# Patient Record
Sex: MALE | URBAN - METROPOLITAN AREA
[De-identification: names, ages, dates, MRNs, and addresses within clinical notes are randomized per-mention and may not be internally consistent; named-entity substitution may affect disease eponyms.]

---

## 2022-03-03 ENCOUNTER — APPOINTMENT (RX ONLY)
Dept: URBAN - METROPOLITAN AREA CLINIC 152 | Facility: CLINIC | Age: 37
Setting detail: DERMATOLOGY
End: 2022-03-03

## 2022-03-03 DIAGNOSIS — L81.4 OTHER MELANIN HYPERPIGMENTATION: ICD-10-CM

## 2022-03-03 DIAGNOSIS — L82.1 OTHER SEBORRHEIC KERATOSIS: ICD-10-CM

## 2022-03-03 DIAGNOSIS — L21.8 OTHER SEBORRHEIC DERMATITIS: ICD-10-CM

## 2022-03-03 DIAGNOSIS — D22 MELANOCYTIC NEVI: ICD-10-CM

## 2022-03-03 DIAGNOSIS — D18.0 HEMANGIOMA: ICD-10-CM

## 2022-03-03 PROBLEM — D18.01 HEMANGIOMA OF SKIN AND SUBCUTANEOUS TISSUE: Status: ACTIVE | Noted: 2022-03-03

## 2022-03-03 PROBLEM — D22.5 MELANOCYTIC NEVI OF TRUNK: Status: ACTIVE | Noted: 2022-03-03

## 2022-03-03 PROCEDURE — ? PRESCRIPTION MEDICATION MANAGEMENT

## 2022-03-03 PROCEDURE — 99203 OFFICE O/P NEW LOW 30 MIN: CPT

## 2022-03-03 PROCEDURE — ? COUNSELING

## 2022-03-03 PROCEDURE — ? OBSERVATION AND MEASURE

## 2022-03-03 ASSESSMENT — LOCATION DETAILED DESCRIPTION DERM: LOCATION DETAILED: SUPRAPUBIC SKIN

## 2022-03-03 ASSESSMENT — LOCATION ZONE DERM: LOCATION ZONE: TRUNK

## 2022-03-03 ASSESSMENT — LOCATION SIMPLE DESCRIPTION DERM: LOCATION SIMPLE: GROIN

## 2022-03-03 NOTE — HPI: OTHER
Condition:: FBSE
Please Describe Your Condition:: Pt is present for a full body skin exam, no spot of concern today. Pt reports no family or personal history of skin cancer. Pt also reports a rash on his temple that has come and gone for the last 2 years that becomes sometimes dry and scaly

## 2022-03-03 NOTE — PROCEDURE: COUNSELING
Detail Level: Simple
Sunscreen Recommendations: CHEMICAL BLOCKERS\\n\\nEucerin Daily Protection Face Lotion \\nNeutrogena Invisible Daily Defense Serum \\nCeraVe Ultra-Light Moisturizing Lotion \\nNeutrogena Hydroboost \\nCetaphil Face Moisturizer \\nLa Roche Posay Toleriane Double Repair Facial Moisturizer \\nElta MD UV Clear \\nLa Roche Posay Antihelios Melt-In Milk Sunscreen \\n\\nPHYSICAL BLOCKERS\\n\\nAveeno Positively Mineral Sunscreen \\nCetaphil Sheer Mineral Face \\nCeraVe Hydrating Mineral Sunscreen (sheer tint) \\nLa Roche Posay Antihelios Mineral Ultra LIght Fluid Face Sunscreen  \\nElta MD UV Daily \\nAvene Solaire UV MIneral Multi-Defense Sunscreen\\nSupergoop CC Screen (multiple shades)\\nTizo AM Replenish (tinted/non-tinted)
Detail Level: Detailed
Patient Specific Counseling (Will Not Stick From Patient To Patient): -Clear today on exam but based on symptoms and location likely seb derm \\n-Can continue otc hydrocortisone

## 2022-03-03 NOTE — PROCEDURE: OBSERVATION
Detail Level: Detailed
Size Of Lesion: [id number]
Morphology Per Location (Optional): Homogenous dark brown papule

## 2024-10-09 ENCOUNTER — APPOINTMENT (RX ONLY)
Dept: URBAN - METROPOLITAN AREA CLINIC 152 | Facility: CLINIC | Age: 39
Setting detail: DERMATOLOGY
End: 2024-10-09

## 2024-10-09 DIAGNOSIS — L84 CORNS AND CALLOSITIES: ICD-10-CM

## 2024-10-09 DIAGNOSIS — L20.89 OTHER ATOPIC DERMATITIS: ICD-10-CM

## 2024-10-09 DIAGNOSIS — D22 MELANOCYTIC NEVI: ICD-10-CM

## 2024-10-09 DIAGNOSIS — L91.8 OTHER HYPERTROPHIC DISORDERS OF THE SKIN: ICD-10-CM

## 2024-10-09 PROBLEM — D22.5 MELANOCYTIC NEVI OF TRUNK: Status: ACTIVE | Noted: 2024-10-09

## 2024-10-09 PROCEDURE — ? PRESCRIPTION

## 2024-10-09 PROCEDURE — ? OBSERVATION AND MEASURE

## 2024-10-09 PROCEDURE — ? PARING HYPERKERATOTIC LESION

## 2024-10-09 PROCEDURE — 99214 OFFICE O/P EST MOD 30 MIN: CPT

## 2024-10-09 PROCEDURE — ? COUNSELING

## 2024-10-09 RX ORDER — PIMECROLIMUS 10 MG/G
CREAM TOPICAL
Qty: 30 | Refills: 6 | Status: ERX | COMMUNITY
Start: 2024-10-09

## 2024-10-09 RX ADMIN — PIMECROLIMUS: 10 CREAM TOPICAL at 00:00

## 2024-10-09 ASSESSMENT — LOCATION DETAILED DESCRIPTION DERM
LOCATION DETAILED: RIGHT PROXIMAL POSTERIOR THIGH
LOCATION DETAILED: SUPRAPUBIC SKIN
LOCATION DETAILED: RIGHT PLANTAR FOREFOOT OVERLYING 3RD METATARSAL
LOCATION DETAILED: LEFT SUPERIOR CENTRAL MALAR CHEEK

## 2024-10-09 ASSESSMENT — LOCATION SIMPLE DESCRIPTION DERM
LOCATION SIMPLE: RIGHT POSTERIOR THIGH
LOCATION SIMPLE: GROIN
LOCATION SIMPLE: RIGHT PLANTAR SURFACE
LOCATION SIMPLE: LEFT CHEEK

## 2024-10-09 ASSESSMENT — LOCATION ZONE DERM
LOCATION ZONE: LEG
LOCATION ZONE: FEET
LOCATION ZONE: TRUNK
LOCATION ZONE: FACE

## 2024-10-09 NOTE — PROCEDURE: OBSERVATION
Detail Level: Detailed
Size Of Lesion: [id number]
Morphology Per Location (Optional): Homogenous dark brown papule

## 2024-10-09 NOTE — PROCEDURE: COUNSELING
Detail Level: Detailed
Detail Level: Simple
Sunscreen Recommendations: CHEMICAL BLOCKERS\\n\\nEucerin Daily Protection Face Lotion \\nNeutrogena Invisible Daily Defense Serum \\nCeraVe Ultra-Light Moisturizing Lotion \\nNeutrogena Hydroboost \\nCetaphil Face Moisturizer \\nLa Roche Posay Toleriane Double Repair Facial Moisturizer \\nElta MD UV Clear \\nLa Roche Posay Antihelios Melt-In Milk Sunscreen \\n\\nPHYSICAL BLOCKERS\\n\\nAveeno Positively Mineral Sunscreen \\nCetaphil Sheer Mineral Face \\nCeraVe Hydrating Mineral Sunscreen (sheer tint) \\nLa Roche Posay Antihelios Mineral Ultra LIght Fluid Face Sunscreen  \\nElta MD UV Daily \\nAvene Solaire UV MIneral Multi-Defense Sunscreen\\nSupergoop CC Screen (multiple shades)\\nTizo AM Replenish (tinted/non-tinted)